# Patient Record
Sex: FEMALE | Race: WHITE | NOT HISPANIC OR LATINO | Employment: OTHER | URBAN - METROPOLITAN AREA
[De-identification: names, ages, dates, MRNs, and addresses within clinical notes are randomized per-mention and may not be internally consistent; named-entity substitution may affect disease eponyms.]

---

## 2017-04-03 DIAGNOSIS — R92.8 OTHER ABNORMAL AND INCONCLUSIVE FINDINGS ON DIAGNOSTIC IMAGING OF BREAST: ICD-10-CM

## 2017-04-20 ENCOUNTER — HOSPITAL ENCOUNTER (OUTPATIENT)
Dept: ULTRASOUND IMAGING | Facility: CLINIC | Age: 77
Discharge: HOME | End: 2017-04-20
Payer: MEDICARE

## 2017-04-20 ENCOUNTER — HOSPITAL ENCOUNTER (OUTPATIENT)
Dept: MAMMOGRAPHY | Facility: CLINIC | Age: 77
Discharge: HOME | End: 2017-04-20
Payer: MEDICARE

## 2017-04-20 DIAGNOSIS — R92.8 OTHER ABNORMAL AND INCONCLUSIVE FINDINGS ON DIAGNOSTIC IMAGING OF BREAST: ICD-10-CM

## 2017-04-20 PROCEDURE — G0204 DX MAMMO INCL CAD BI: HCPCS

## 2017-04-20 PROCEDURE — 76642 ULTRASOUND BREAST LIMITED: CPT

## 2017-04-25 ENCOUNTER — GENERIC CONVERSION - ENCOUNTER (OUTPATIENT)
Dept: OTHER | Facility: OTHER | Age: 77
End: 2017-04-25

## 2017-04-25 ENCOUNTER — TRANSCRIBE ORDERS (OUTPATIENT)
Dept: ADMINISTRATIVE | Facility: HOSPITAL | Age: 77
End: 2017-04-25

## 2017-04-25 DIAGNOSIS — R92.8 ABNORMAL MAMMOGRAM: Primary | ICD-10-CM

## 2018-01-09 NOTE — MISCELLANEOUS
Message   Recorded as Task   Date: 10/26/2016 08:52 AM, Created By: Harding Cabot   Task Name: Go to Result   [facility]   Regarding Patient: Carolyne Sams, Status: Active   CommentMarikasharon Ice - 26 Oct 2016 8:52 AM     TASK CREATED  6 month follow-up, informed patient and arrange  Devon Hayes - 26 Oct 2016 10:51 AM     TASK EDITED  Left message for patient to call office  [facility] - 28 Oct 2016 1:07 PM     TASK EDITED  Patient returned call and informed her the u/s was benign and she will need a follow-up mammo of both breast and u/s of the left breast in 6 months  Patient already has it scheduled for 04/20/17  Patient had no questions or concerns  Task in to check for results  Active Problems    1  Abnormal mammogram (793 80) (R92 8)   2  Intraductal papilloma of breast (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) (D24 9)   3  Seborrheic keratosis (702 19) (L82 1)    Current Meds   1  [employer] Vitamin D CAPS; Therapy: (Recorded:51Rer7019) to Recorded   2  Multi Vitamin/Minerals Oral Tablet; TAKE 1 TABLET DAILY; Therapy: (Recorded:86Yqm4519) to Recorded   3  Multivitamins TABS; TAKE 1 TABLET DAILY; Therapy: (Recorded:94Thv0082) to Recorded   4  Omega 3 CAPS; TAKE AS DIRECTED; Therapy: (Recorded:01Cea3140) to Recorded   5  Zoloft 50 MG Oral Tablet (Sertraline HCl); TAKE 1 TABLET DAILY; Therapy: (Recorded:27Apw0373) to Recorded    Allergies    1  No Known Drug Allergies    2   Other    Signatures   Electronically signed by : Kristina July, ; Oct 28 2016  1:09PM EST                       (Author)

## 2018-01-10 NOTE — MISCELLANEOUS
Message      Recorded as Task   Date: 04/19/2016 01:19 PM, Created By: Carlos Torre   Task Name: Verify Patient Results   Assigned To: Shell Gaitan   Regarding Patient: Roseanne Mcdonald, Status: Complete   Comment:    Shell Gaitan - 19 Apr 2016 1:19 PM        Marybeth Coronel - 20 Apr 2016 10:01 AM     TASK EDITED  Message left for patient to call the office for results  Marybeth Coronel - 20 Apr 2016 3:38 PM     TASK EDITED  Patient called the office  Results were reviewed with her  The 6 month follow up left breast ultrasound order was processed and scheduled on 10/20/2016 @ 1030  Order mailed to patient's home address  She will call the office to schedule her yearly breast exam in October  Active Problems    1  Abnormal mammogram (793 80) (R92 8)   2  Intraductal papilloma of breast (217) (D24 9)   3  Seborrheic keratosis (702 19) (L82 1)    Current Meds   1  [employer]S Vitamin D CAPS; Therapy: (Recorded:10Pqw2793) to Recorded   2  Multi Vitamin/Minerals Oral Tablet; TAKE 1 TABLET DAILY; Therapy: (Recorded:22Vlx3961) to Recorded   3  Multivitamins TABS; TAKE 1 TABLET DAILY; Therapy: (Recorded:09Vbn3605) to Recorded   4  Omega 3 CAPS; TAKE AS DIRECTED; Therapy: (Recorded:91Onk8498) to Recorded   5  Zoloft 50 MG Oral Tablet (Sertraline HCl); TAKE 1 TABLET DAILY; Therapy: (Recorded:71Egd2512) to Recorded    Allergies    1  No Known Drug Allergies    2  Other    Plan  Abnormal mammogram    · *US BREAST LEFT LIMITED (DIAGNOSTIC); Status:Active;  Requested OCZ:[id number]  10:30AM;     Signatures   Electronically signed by : Matthew Grimm, ; Apr 25 2016  1:24PM EST                       (Author)

## 2018-01-12 NOTE — MISCELLANEOUS
Message   Recorded as Task   Date: 04/20/2017 03:29 PM, Created By: Lenadro Watkins   Task Name: Go to Result   Assigned To: [facility],Team   Regarding Patient: Naa Valle, Status: In Progress   Comment:    Shell Gaitan - 20 Apr 2017 3:29 PM     TASK CREATED  6 month follow-up left breast ultrasound, and one year bilateral mammogram   Call with probably benign results  Naty Chaidez - 20 Apr 2017 4:53 PM     TASK EDITED  Called and left message for patient to return call to office  Patient needs to schedule RCL with Naty Jiménez - 24 Apr 2017 8:14 AM     TASK EDITED  {late entry} Rec'd call from patient Friday AM  Would like us to schedule 6 month left breast u/s - any day between 11:00 - 3:00 pm  Patient is aware that I will mail her her script AND mail her an appointment card for her follow up appointment with Dr Arsh Rodriguez in the [facility] office  Sukh Love - 25 Apr 2017 11:07 AM     TASK EDITED  Called patient, left message for patient to return call to office  Sukh Love - 25 Apr 2017 11:19 AM     TASK EDITED  Rec'd return call from patient and informed her of the appointments and that we would be sending the cards and order in the mail  Scheduled patient for [address] on 10/23/2017 @ 11:30A for U/S Left Breast    -informed patient of no deodorant or lotions  Scheduled follow-up appointment with Dr Arsh Rodriguez [facility]  Active Problems    1  Abnormal mammogram (793 80) (R92 8)   2  Intraductal papilloma of breast (217) (D24 9)   3  Seborrheic keratosis (702 19) (L82 1)    Current Meds   1  CVS Vitamin D CAPS; Therapy: (Recorded:70Gko4643) to Recorded   2  Multi Vitamin/Minerals Oral Tablet; TAKE 1 TABLET DAILY; Therapy: (Recorded:33Mfa0204) to Recorded   3  Multivitamins TABS; TAKE 1 TABLET DAILY; Therapy: (Recorded:06Rcr8170) to Recorded   4  Omega 3 CAPS; TAKE AS DIRECTED;    Therapy: (Recorded:17Ban5642) to Recorded   5  Zoloft 50 MG Oral Tablet (Sertraline HCl); TAKE 1 TABLET DAILY; Therapy: (Recorded:16Ovc5831) to Recorded    Allergies    1  No Known Drug Allergies    2   Other    Signatures   Electronically signed by : Anthony Yung, ; Apr 25 2017 11:54AM EST                       (Author)

## 2018-01-12 NOTE — MISCELLANEOUS
Message   Recorded as Task   Date: 04/25/2016 09:18 AM, Created By: Mary Johnson   Task Name: Call Back   Assigned To: [facility],Team   Regarding Patient: Teddy Aguirre, Status: Active   Comment:    IsmaBryanna - 25 Apr 2016 9:18 AM     TASK CREATED  KZ - U/S 6 months  Mammo 1 year  TB   Marybeth Coronel - 25 Apr 2016 1:27 PM     TASK EDITED  Results were reviewed with her  The 6 month follow up left breast ultrasound order was processed and scheduled on 10/20/2016 @ 1030  Order mailed to patient's home address  She will call the office to schedule her yearly breast exam in October  Active Problems    1  Abnormal mammogram (793 80) (R92 8)   2  Intraductal papilloma of breast (217) (D24 9)   3  Seborrheic keratosis (702 19) (L82 1)    Current Meds   1  CVS Vitamin D CAPS; Therapy: (Recorded:54Cic9834) to Recorded   2  Multi Vitamin/Minerals Oral Tablet; TAKE 1 TABLET DAILY; Therapy: (Recorded:41Ecv7095) to Recorded   3  Multivitamins TABS; TAKE 1 TABLET DAILY; Therapy: (Recorded:31Fnu1188) to Recorded   4  Omega 3 CAPS; TAKE AS DIRECTED; Therapy: (Recorded:48Kjp7971) to Recorded   5  Zoloft 50 MG Oral Tablet (Sertraline HCl); TAKE 1 TABLET DAILY; Therapy: (Recorded:41Dfm5211) to Recorded    Allergies    1  No Known Drug Allergies    2  Other    Plan  Abnormal mammogram    · *US BREAST LEFT LIMITED (DIAGNOSTIC); Status:Active;  Requested GCD:[id number]  10:30AM;     Signatures   Electronically signed by : Jc Godoy, ; Apr 25 2016  1:27PM EST                       (Author)

## 2018-01-15 NOTE — MISCELLANEOUS
Message   Recorded as Task   Date: 04/25/2016 09:46 AM, Created By: Tracey Moreno   Task Name: Go to Result   Assigned To: [facility],Team   Regarding Patient: Diana Hughes, Status: Active   Comment:    GaitanShell - 25 Apr 2016 9:46 AM     TASK CREATED  6 month follow-up left ultrasound per report  Please inform patient  Marybeth Coronel - 25 Apr 2016 1:27 PM     TASK EDITED  Results were reviewed with her  The 6 month follow up left breast ultrasound order was processed and scheduled on 10/20/2016 @ 1030  Order mailed to patient's home address  She will call the office to schedule her yearly breast exam in October  Active Problems    1  Abnormal mammogram (793 80) (R92 8)   2  Intraductal papilloma of breast (217) (D24 9)   3  Seborrheic keratosis (702 19) (L82 1)    Current Meds   1  CVS Vitamin D CAPS; Therapy: (Recorded:34Zrw6367) to Recorded   2  Multi Vitamin/Minerals Oral Tablet; TAKE 1 TABLET DAILY; Therapy: (Recorded:07Aba2796) to Recorded   3  Multivitamins TABS; TAKE 1 TABLET DAILY; Therapy: (Recorded:07Koe6696) to Recorded   4  Omega 3 CAPS; TAKE AS DIRECTED; Therapy: (Recorded:90Ktv1566) to Recorded   5  Zoloft 50 MG Oral Tablet (Sertraline HCl); TAKE 1 TABLET DAILY; Therapy: (Recorded:79Maa3867) to Recorded    Allergies    1  No Known Drug Allergies    2  Other    Plan  Abnormal mammogram    · *US BREAST LEFT LIMITED (DIAGNOSTIC); Status:Active;  Requested BXO:51XEN1148  10:30AM;     Signatures   Electronically signed by : Asaf Moran, ; Apr 25 2016  1:27PM EST                       (Author)